# Patient Record
Sex: MALE | Race: WHITE | HISPANIC OR LATINO
[De-identification: names, ages, dates, MRNs, and addresses within clinical notes are randomized per-mention and may not be internally consistent; named-entity substitution may affect disease eponyms.]

---

## 2021-07-02 ENCOUNTER — TRANSCRIPTION ENCOUNTER (OUTPATIENT)
Age: 26
End: 2021-07-02

## 2022-05-01 ENCOUNTER — EMERGENCY (EMERGENCY)
Facility: HOSPITAL | Age: 27
LOS: 0 days | Discharge: HOME | End: 2022-05-01
Attending: STUDENT IN AN ORGANIZED HEALTH CARE EDUCATION/TRAINING PROGRAM | Admitting: STUDENT IN AN ORGANIZED HEALTH CARE EDUCATION/TRAINING PROGRAM
Payer: COMMERCIAL

## 2022-05-01 VITALS
HEIGHT: 73 IN | OXYGEN SATURATION: 98 % | SYSTOLIC BLOOD PRESSURE: 137 MMHG | RESPIRATION RATE: 18 BRPM | WEIGHT: 240.08 LBS | TEMPERATURE: 98 F | DIASTOLIC BLOOD PRESSURE: 82 MMHG | HEART RATE: 74 BPM
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
## 2022-05-01 DIAGNOSIS — M25.512 PAIN IN LEFT SHOULDER: ICD-10-CM
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
## 2022-05-01 PROCEDURE — 99283 EMERGENCY DEPT VISIT LOW MDM: CPT

## 2022-05-01 RX ORDER — IBUPROFEN 200 MG
1 TABLET ORAL
Qty: 20 | Refills: 0
Start: 2022-05-01 | End: 2022-05-05

## 2022-05-01 RX ORDER — METHOCARBAMOL 500 MG/1
2 TABLET, FILM COATED ORAL
Qty: 40 | Refills: 0
Start: 2022-05-01 | End: 2022-05-05

## 2022-05-01 NOTE — ED PROVIDER NOTE - NS ED ROS FT
Review of Systems    Constitutional: (-) fever   Eyes/ENT: (-) vision changes  Cardiovascular: (-) chest pain, (-) syncope (-) palpitations  Respiratory: (-) cough, (-) shortness of breath  Gastrointestinal: (-) vomiting, (-) diarrhea  (-) abdominal pain  Genitourinary:  (-) dysuria   Musculoskeletal: (-) neck pain, (-) back pain, (-) leg pain/swelling see hpi   Integumentary: (-) rash, (-) edema  Neurological: (-) headache  Hematologic: (-) easy bruising

## 2022-05-01 NOTE — ED PROVIDER NOTE - NS ED ATTENDING STATEMENT MOD
This was a shared visit with the MDAELINE. I reviewed and verified the documentation and independently performed the documented:
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
## 2022-05-01 NOTE — ED PROVIDER NOTE - PHYSICAL EXAMINATION
PHYSICAL EXAM:    GENERAL: Alert, appears stated age, well appearing, non-toxic  SKIN: Warm, pink and dry. MMM.   HEAD: NC, AT, no step offs   EYE: Normal lids/conjunctiva  ENT: Normal hearing, patent oropharynx  NECK: +supple. No meningismus, or JVD, +Trachea midline. no tenderness/step offs.   Pulm: Bilateral BS, normal resp effort, no wheezes, stridor, or retractions  CV: RRR, no M/R/G, 2+and = radial pulses  Abd: soft, non-tender, non-distended, no rebound/guarding   Mskel: no erythema, cyanosis, edema. no calf tenderness. no spinal TTP. no shouldeer ttp. no decreased rom. no external evidence of trauma  Neuro: AAOx3, no sensory/motor deficits,  No speech slurring, pronator drift, facial asymmetry.. 5/5 strength throughout. normal gait.

## 2022-05-01 NOTE — ED PROVIDER NOTE - NSFOLLOWUPINSTRUCTIONS_ED_ALL_ED_FT

## 2022-05-01 NOTE — ED PROVIDER NOTE - PATIENT PORTAL LINK FT
You can access the FollowMyHealth Patient Portal offered by [facility] by registering at the following website: [email or web address]. By joining [employer]’s FollowMyHealth portal, you will also be able to view your health information using other applications (apps) compatible with our system.

## 2022-10-18 ENCOUNTER — APPOINTMENT (OUTPATIENT)
Dept: NEUROLOGY | Facility: CLINIC | Age: 27
End: 2022-10-18

## 2022-10-18 PROBLEM — Z00.00 ENCOUNTER FOR PREVENTIVE HEALTH EXAMINATION: Status: ACTIVE | Noted: 2022-10-18

## 2022-12-07 ENCOUNTER — APPOINTMENT (OUTPATIENT)
Dept: NEUROLOGY | Facility: CLINIC | Age: 27
End: 2022-12-07

## 2022-12-07 VITALS
HEIGHT: 72 IN | WEIGHT: 235 LBS | SYSTOLIC BLOOD PRESSURE: 138 MMHG | HEART RATE: 78 BPM | DIASTOLIC BLOOD PRESSURE: 95 MMHG | BODY MASS INDEX: 31.83 KG/M2

## 2022-12-07 PROCEDURE — 99214 OFFICE O/P EST MOD 30 MIN: CPT

## 2022-12-07 PROCEDURE — 99072 ADDL SUPL MATRL&STAF TM PHE: CPT
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
## 2022-12-07 NOTE — ASSESSMENT
[FreeTextEntry1] :  cervical/thoracic pain -since he has done physical therapy for a month without resolution of his pain, recommend getting a MRI of the cervical / thoracic spine for further evaluation and since he has done physical therapy without  consistent improvement, I recommend seen pain management for further treatment

## 2022-12-07 NOTE — PHYSICAL EXAM
[Person] : oriented to person [Place] : oriented to place [Time] : oriented to time [Concentration Intact] : normal concentrating ability [Visual Intact] : visual attention was ~T not ~L decreased [Naming Objects] : no difficulty naming common objects [Repeating Phrases] : no difficulty repeating a phrase [Writing A Sentence] : no difficulty writing a sentence [Fluency] : fluency intact [Comprehension] : comprehension intact [Reading] : reading intact [Past History] : adequate knowledge of personal past history [Cranial Nerves Optic (II)] : visual acuity intact bilaterally,  visual fields full to confrontation, pupils equal round and reactive to light [Cranial Nerves Oculomotor (III)] : extraocular motion intact [Cranial Nerves Trigeminal (V)] : facial sensation intact symmetrically [Cranial Nerves Facial (VII)] : face symmetrical [Cranial Nerves Vestibulocochlear (VIII)] : hearing was intact bilaterally [Cranial Nerves Glossopharyngeal (IX)] : tongue and palate midline [Cranial Nerves Accessory (XI - Cranial And Spinal)] : head turning and shoulder shrug symmetric [Cranial Nerves Hypoglossal (XII)] : there was no tongue deviation with protrusion [Motor Tone] : muscle tone was normal in all four extremities [Motor Strength] : muscle strength was normal in all four extremities [No Muscle Atrophy] : normal bulk in all four extremities [Sensation Tactile Decrease] : light touch was intact [Abnormal Walk] : normal gait [Balance] : balance was intact [Past-pointing] : there was no past-pointing [Tremor] : no tremor present [2+] : Ankle jerk left 2+ [Plantar Reflex Right Only] : normal on the right [Plantar Reflex Left Only] : normal on the left [FreeTextEntry6] :   Stiffness of bilateral trapezii as well as paraspinal muscles in the cervical and thoracic region

## 2022-12-24 ENCOUNTER — EMERGENCY (EMERGENCY)
Facility: HOSPITAL | Age: 27
LOS: 0 days | Discharge: HOME | End: 2022-12-24
Attending: EMERGENCY MEDICINE | Admitting: EMERGENCY MEDICINE
Payer: COMMERCIAL

## 2022-12-24 VITALS
HEIGHT: 72 IN | TEMPERATURE: 97 F | OXYGEN SATURATION: 99 % | HEART RATE: 84 BPM | RESPIRATION RATE: 18 BRPM | WEIGHT: 240.08 LBS | DIASTOLIC BLOOD PRESSURE: 83 MMHG | SYSTOLIC BLOOD PRESSURE: 181 MMHG

## 2022-12-24 DIAGNOSIS — L08.9 LOCAL INFECTION OF THE SKIN AND SUBCUTANEOUS TISSUE, UNSPECIFIED: ICD-10-CM

## 2022-12-24 DIAGNOSIS — N48.89 OTHER SPECIFIED DISORDERS OF PENIS: ICD-10-CM

## 2022-12-24 PROCEDURE — 99284 EMERGENCY DEPT VISIT MOD MDM: CPT

## 2022-12-24 RX ORDER — KETOROLAC TROMETHAMINE 30 MG/ML
30 SYRINGE (ML) INJECTION ONCE
Refills: 0 | Status: DISCONTINUED | OUTPATIENT
Start: 2022-12-24 | End: 2022-12-24

## 2022-12-24 RX ORDER — CEPHALEXIN 500 MG
1 CAPSULE ORAL
Qty: 40 | Refills: 0
Start: 2022-12-24 | End: 2023-01-02

## 2022-12-24 RX ADMIN — Medication 30 MILLIGRAM(S): at 20:31

## 2022-12-24 NOTE — ED PROVIDER NOTE - OBJECTIVE STATEMENT
27-year-old male no pertinent past medical history presents for evaluation of penile lesion.  Patient states he has had small bump on his penis for years and yesterday tried to pop it unsuccessfully and since developed redness mild swelling around the area on the shaft of his penis.  Now presents with mild aching pain localized to the area, no aggravating or relieving factors.  Denies fever, headache, chest pain, shortness of breath, numbness, weakness, dysuria, hematuria, discharge, change in sexual partners

## 2022-12-24 NOTE — ED PROVIDER NOTE - PHYSICAL EXAMINATION
CONST: Well appearing in NAD  EYES: Sclera and conjunctiva clear.   ENT: No nasal discharge. Oropharynx normal appearing, no erythema or exudates. No abscess or swelling. Uvula midline.   NECK: Non-tender, no meningeal signs. normal ROM. supple   CARD: S1 S2; No jvd  RESP: Equal BS B/L, No wheezes, rhonchi or rales. No distress  GI: Soft, non-tender, non-distended. no cva tenderness. normal BS  : mild erythema to L shaft of penis with mild induration. Nontender, no dc, nontender testicles. (-) phrens, (+) cremasteric   MS: Normal ROM in all extremities. pulses 2 +. no calf tenderness or swelling  SKIN: Warm, dry, no acute rashes. Good turgor  NEURO: A&Ox3, No focal deficits. Strength 5/5 with no sensory deficits.

## 2022-12-24 NOTE — ED PROVIDER NOTE - ATTENDING APP SHARED VISIT CONTRIBUTION OF CARE
27 y.o. male, no PMH, comes in for evaluation of penile lesion.  Patient states he has had small bump on his penis for years and yesterday tried to pop it unsuccessfully and since developed redness and mild swelling around the area on the shaft of his penis.  Now presents with mild aching pain localized to the area, no aggravating or relieving factors.  Denies fever, headache, chest pain, shortness of breath, numbness, weakness, dysuria, hematuria, discharge, change in sexual partners. On exam, pt in NAD, AAOx3, head NC/AT, CN II-XII intact, PEERL, EOMi, neck (-) midline tenderness, lungs CTA B/L, CV S1S2 regular, abdomen soft/NT/ND/(+)BS, ext (-) edema, motor 5/5x4, sensation intact,  (+) mild erythema to left side of the shaft of the penis with mild induration. Testicles NT, cremasteric reflex intact. Will d/c with abx. Advised to return for worsening of symptoms.

## 2022-12-24 NOTE — ED PROVIDER NOTE - PATIENT PORTAL LINK FT
You can access the FollowMyHealth Patient Portal offered by [facility] by registering at the following website: [email or web address]. By joining [employer]’s FollowMyHealth portal, you will also be able to view your health information using other applications (apps) compatible with our system.

## 2023-01-19 ENCOUNTER — APPOINTMENT (OUTPATIENT)
Dept: PAIN MANAGEMENT | Facility: CLINIC | Age: 28
End: 2023-01-19
Payer: COMMERCIAL

## 2023-01-19 VITALS — BODY MASS INDEX: 31.83 KG/M2 | WEIGHT: 235 LBS | HEIGHT: 72 IN

## 2023-01-19 PROCEDURE — 99072 ADDL SUPL MATRL&STAF TM PHE: CPT

## 2023-01-19 PROCEDURE — 99204 OFFICE O/P NEW MOD 45 MIN: CPT

## 2023-01-19 RX ORDER — MELOXICAM 15 MG/1
15 TABLET ORAL DAILY
Qty: 30 | Refills: 0 | Status: ACTIVE | COMMUNITY
Start: 2023-01-19 | End: 1900-01-01

## 2023-01-23 NOTE — PHYSICAL EXAM
[de-identified] : Cervical Spine Exam:\par \par Inspection:\par erythema (-) \par ecchymosis (-) \par rashes (-) \par \par Palpation:                                        \par Cervical paraspinal mm tenderness:   R (+); L(+)\par Upper trapezius mm tenderness:        R (+); L (+)\par Thoracic paraspinal muscles tenderness: R+, L+\par Rhomboids tenderness:                       R (-); L (-)\par Scalenes mm tenderness:                   R (-); L (-)\par Occipital Ridge:                                    R (-); L (-)\par Supraspinatus mm tenderness:           R (-); L (-)\par \par Strength Testing:            Right    Left\par Deltoid                             (5/5)    (5/5)\par Biceps:                            (5/5)    (5/5)\par Triceps:                           (5/5)    (5/5)\par Finger Abductors:           (5/5)    (5/5)\par Grasp:                             (5/5)    (5/5)\par \par Special Testing:\par Spurling Test:                  R (-); L (-)\par Facet load test:               R (-); L (-)
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
## 2023-01-23 NOTE — REASON FOR VISIT
[Initial Visit] : an initial pain management visit [FreeTextEntry2] : evaluation neck and back pain

## 2023-01-23 NOTE — DISCUSSION/SUMMARY
[de-identified] : Recomendations\par 1. meloxicam 15mg daily PRN\par I advised CHIKI that the NSAID should be taken with food.  In addition while taking the prescribed NSAID, no over the counter or other NSAIDs should be used, such as ibuprofen (Motrin or Advil) or naproxen (Aleve) as this can cause stomach upset or other side effects.  If needed for fever or breakthrough pain Tylenol can be used.\par \par 2. MRI imaging of cervical / thoracic spine\par f/u when imaging complete\par \par f/u for imaging review 1 month

## 2023-03-16 ENCOUNTER — APPOINTMENT (OUTPATIENT)
Dept: PAIN MANAGEMENT | Facility: CLINIC | Age: 28
End: 2023-03-16
Payer: COMMERCIAL

## 2023-03-16 PROCEDURE — 99072 ADDL SUPL MATRL&STAF TM PHE: CPT

## 2023-03-16 PROCEDURE — 99213 OFFICE O/P EST LOW 20 MIN: CPT

## 2023-03-20 NOTE — PHYSICAL EXAM
[de-identified] : Cervical Spine Exam:\par \par Inspection:\par erythema (-) \par ecchymosis (-) \par rashes (-) \par \par Palpation:                                        \par Cervical paraspinal mm tenderness:   R (+); L(+)\par Upper trapezius mm tenderness:        R (+); L (+)\par Thoracic paraspinal muscles tenderness: R+, L+ \par Rhomboids tenderness:                       R (-); L (-)\par Scalenes mm tenderness:                   R (-); L (-)\par Occipital Ridge:                                    R (-); L (-)\par Supraspinatus mm tenderness:           R (-); L (-)\par \par Strength Testing:            Right    Left\par Deltoid                             (5/5)    (5/5)\par Biceps:                            (5/5)    (5/5)\par Triceps:                           (5/5)    (5/5)\par Finger Abductors:           (5/5)    (5/5)\par Grasp:                             (5/5)    (5/5)\par \par Special Testing:\par Spurling Test:                  R (-); L (-)\par Facet load test:               R (-); L (-)

## 2023-03-20 NOTE — DATA REVIEWED
[FreeTextEntry1] : MRI of the cervical spine taken on 3/15/23 shows broad based central posterior disc herniation with annular tear at C3-C4 indenting the thecal sac. Disc bulges at C4-C5 and C6-C7 indenting the thecal sac.\par \par MRI of the Thoracic spine taken on 3/15/23 showed broad based central posterior disc herniation with annular tear and superior extrusion at T7-T8 indenting the thecal sac.

## 2023-03-20 NOTE — DISCUSSION/SUMMARY
[de-identified] : Recommendations.\par \par 1. Consult with Dr. Burnett to discuss surgical option before trying epidural. The patient is reluctant to try the epidural before hearing surgical options. \par \par 2. Home Exercise\par I gave the patient a list of home exercises to do for pain reduction and overall improvement in functional status including but not limited to active neck rotation, active neck side bend, neck flexion, neck extension, chin tuck, scalene stretch, isometric neck flexion, isometric neck extension, isometric neck sidebend, headlift/neck curl, headlift/neck sidebend, neck extension on hands and knees, and scapula squeeze.\par \par I Ashli Hayes attest that this documentation has been prepared under the direction and in the presence of provider Dr. Mark Sanchez. \par The documentation recorded by the scribe in my presence, accurately reflects the service I personally performed, and the decisions made by me with my edits as appropriate. \par \par Mark Sanchez, DO\par
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
## 2023-03-29 ENCOUNTER — APPOINTMENT (OUTPATIENT)
Dept: NEUROLOGY | Facility: CLINIC | Age: 28
End: 2023-03-29

## 2023-04-25 ENCOUNTER — APPOINTMENT (OUTPATIENT)
Dept: NEUROSURGERY | Facility: CLINIC | Age: 28
End: 2023-04-25
Payer: COMMERCIAL

## 2023-04-25 ENCOUNTER — RESULT REVIEW (OUTPATIENT)
Age: 28
End: 2023-04-25

## 2023-04-25 VITALS — BODY MASS INDEX: 31.15 KG/M2 | HEIGHT: 72 IN | WEIGHT: 230 LBS

## 2023-04-25 PROCEDURE — 99204 OFFICE O/P NEW MOD 45 MIN: CPT

## 2023-04-27 NOTE — END OF VISIT
[FreeTextEntry3] : ATTENDING ATTESTATION\par Patient seen and examined at bedside, all relevant diagnostic imaging and electronic medical record were independently evaluated by me.  Assessment and plan per ACP note above. [Time Spent: ___ minutes] : I have spent [unfilled] minutes of time on the encounter.
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
## 2023-05-23 ENCOUNTER — APPOINTMENT (OUTPATIENT)
Dept: NEUROSURGERY | Facility: CLINIC | Age: 28
End: 2023-05-23

## 2024-01-18 ENCOUNTER — OUTPATIENT (OUTPATIENT)
Dept: OUTPATIENT SERVICES | Facility: HOSPITAL | Age: 29
LOS: 1 days | End: 2024-01-18
Payer: COMMERCIAL

## 2024-01-18 DIAGNOSIS — M54.6 PAIN IN THORACIC SPINE: ICD-10-CM

## 2024-01-18 DIAGNOSIS — M54.2 CERVICALGIA: ICD-10-CM

## 2024-01-18 PROCEDURE — 72082 X-RAY EXAM ENTIRE SPI 2/3 VW: CPT | Mod: 26

## 2024-01-18 PROCEDURE — 72052 X-RAY EXAM NECK SPINE 6/>VWS: CPT

## 2024-01-18 PROCEDURE — 72050 X-RAY EXAM NECK SPINE 4/5VWS: CPT | Mod: 26,59

## 2024-01-18 PROCEDURE — 72082 X-RAY EXAM ENTIRE SPI 2/3 VW: CPT

## 2024-01-19 DIAGNOSIS — M54.2 CERVICALGIA: ICD-10-CM

## 2024-01-19 DIAGNOSIS — M54.6 PAIN IN THORACIC SPINE: ICD-10-CM

## 2024-01-30 ENCOUNTER — APPOINTMENT (OUTPATIENT)
Dept: PAIN MANAGEMENT | Facility: CLINIC | Age: 29
End: 2024-01-30

## 2024-02-16 ENCOUNTER — APPOINTMENT (OUTPATIENT)
Dept: PAIN MANAGEMENT | Facility: CLINIC | Age: 29
End: 2024-02-16
Payer: COMMERCIAL

## 2024-02-16 PROCEDURE — 99214 OFFICE O/P EST MOD 30 MIN: CPT

## 2024-02-16 RX ORDER — METHYLPREDNISOLONE 4 MG/1
4 TABLET ORAL
Qty: 1 | Refills: 0 | Status: ACTIVE | COMMUNITY
Start: 2024-02-16 | End: 1900-01-01

## 2024-02-18 NOTE — PHYSICAL EXAM
[de-identified] : Cervical Spine Exam:\par  \par  Inspection:\par  erythema (-) \par  ecchymosis (-) \par  rashes (-) \par  \par  Palpation:                                        \par  Cervical paraspinal mm tenderness:   R (+); L(+)\par  Upper trapezius mm tenderness:        R (+); L (+)\par  Thoracic paraspinal muscles tenderness: R+, L+ \par  Rhomboids tenderness:                       R (-); L (-)\par  Scalenes mm tenderness:                   R (-); L (-)\par  Occipital Ridge:                                    R (-); L (-)\par  Supraspinatus mm tenderness:           R (-); L (-)\par  \par  Strength Testing:            Right    Left\par  Deltoid                             (5/5)    (5/5)\par  Biceps:                            (5/5)    (5/5)\par  Triceps:                           (5/5)    (5/5)\par  Finger Abductors:           (5/5)    (5/5)\par  Grasp:                             (5/5)    (5/5)\par  \par  Special Testing:\par  Spurling Test:                  R (-); L (-)\par  Facet load test:               R (-); L (-)

## 2024-02-18 NOTE — DISCUSSION/SUMMARY
[de-identified] : A discussion regarding available pain management treatment options occurred with the patient. These included interventional, rehabilitative, pharmacological, and alternative modalities. We will proceed with the following:   Interventional treatment options: 1. Proceed with a Cervical epidural steroid injection  Treatment options were discussed. The patient has been having persistent, severe neck pain and cervical radicular pain that has minimally improved with conservative therapy thus far (including physical therapy/chiropractic manipulations/home stretching and anti-inflammatory medications for 8 weeks. The patient was given the option of proceeding with a cervical epidural steroid injection to try to get the patient some pain relief.   The patient has trialed conservative care including home exercise program and nsaid use.  Pain has been persistent for over 1 year and is interested in pursuing consistent pain relief with interventional options, if indicated.   Medications: 1. Prescribed a Medrol dos 21-tablet, 6-day taper pack to bridge his pain until he is scheduled for this injection.  - Follow up in 2 weeks after injection.   I Shagufta Hutchinson attest that this documentation has been prepared under the direction and in the presence of provider Dr. Mark Sanchez.  The documentation recorded by the scribe in my presence, accurately reflects the service I personally performed, and the decisions made by me with my edits as appropriate.   Mark Sanchez, DO

## 2024-02-18 NOTE — DATA REVIEWED
[FreeTextEntry1] : MRI of the cervical spine taken on 3/15/23 shows broad based central posterior disc herniation with annular tear at C3-C4 indenting the thecal sac. Disc bulges at C4-C5 and C6-C7 indenting the thecal sac.  MRI of the Thoracic spine taken on 3/15/23 showed broad based central posterior disc herniation with annular tear and superior extrusion at T7-T8 indenting the thecal sac.   X-ray of the cervical spine taken on 1- Impression: C1-C7 are visualized on the lateral views. The vertebral body heights are maintained. There is trace anterolisthesis of C2 on C3 without dynamic instability. The intervertebral disc spaces are grossly preserved. The prevertebral soft tissues are not thickened.
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
## 2024-04-02 ENCOUNTER — APPOINTMENT (OUTPATIENT)
Dept: NEUROSURGERY | Facility: CLINIC | Age: 29
End: 2024-04-02
Payer: COMMERCIAL

## 2024-04-02 VITALS — HEIGHT: 72 IN | WEIGHT: 230 LBS | BODY MASS INDEX: 31.15 KG/M2

## 2024-04-02 DIAGNOSIS — M54.2 CERVICALGIA: ICD-10-CM

## 2024-04-02 PROCEDURE — 99213 OFFICE O/P EST LOW 20 MIN: CPT
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
## 2024-04-09 PROBLEM — M54.2 CERVICALGIA: Status: ACTIVE | Noted: 2022-10-18

## 2024-05-15 ENCOUNTER — APPOINTMENT (OUTPATIENT)
Dept: PAIN MANAGEMENT | Facility: CLINIC | Age: 29
End: 2024-05-15
Payer: COMMERCIAL

## 2024-05-15 PROCEDURE — 99213 OFFICE O/P EST LOW 20 MIN: CPT

## 2024-05-15 NOTE — DISCUSSION/SUMMARY
[de-identified] : A discussion regarding available pain management treatment options occurred with the patient. These included interventional, rehabilitative, pharmacological, and alternative modalities. We will proceed with the following:   Interventional treatment options: 1. Proceed with a C7-T1 cervical epidural steroid injection with sedation.  Treatment options were discussed. The patient has been having persistent, severe neck pain and cervical radicular pain that has minimally improved with conservative therapy thus far (including physical therapy/chiropractic manipulations/home stretching and anti-inflammatory medications for 8 weeks. The patient was given the option of proceeding with a cervical epidural steroid injection to try to get the patient some pain relief.   The patient has severe anxiety of procedures that necessitates monitored anesthesia care (MAC). The procedure performed will be close to major nerves, arteries, and spinal cord and/or joint structures. Due to the proximity of these structures, we need the patient to be still during the procedure.  With the help of MAC, this will be safely achieved and decrease the risk of any complications.   - Follow up in 2 weeks post injection.   I Shagufta Hutchinson attest that this documentation has been prepared under the direction and in the presence of provider Dr. Mark Sanchez.  The documentation recorded by the scribe in my presence, accurately reflects the service I personally performed, and the decisions made by me with my edits as appropriate.   Mark Sanchez, DO

## 2024-05-15 NOTE — PHYSICAL EXAM
[de-identified] : C spine  No rashes, erythema, edema noted TTP b/l cervical paraspinal and trapezius muscles Positive spurlings bilaterally RUE: 5/5 strength LUE: 5/5 strength
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
## 2024-05-30 ENCOUNTER — APPOINTMENT (OUTPATIENT)
Dept: PAIN MANAGEMENT | Facility: CLINIC | Age: 29
End: 2024-05-30
Payer: COMMERCIAL

## 2024-05-30 ENCOUNTER — APPOINTMENT (OUTPATIENT)
Dept: PAIN MANAGEMENT | Facility: CLINIC | Age: 29
End: 2024-05-30

## 2024-05-30 PROCEDURE — 00600 ANES PX CRV SPINE&CORD NOS: CPT | Mod: QZ,P1

## 2024-05-30 PROCEDURE — 62321 NJX INTERLAMINAR CRV/THRC: CPT

## 2024-06-04 NOTE — PROCEDURE
[FreeTextEntry3] : Date of Service: 05/30/2024   Account: [id number]  Patient: CHIKI CHOI   YOB: 1995  Age: 28 year  Surgeon:      Mark Sanchez D.O.  Assistant:    None  Pre-Operative Diagnosis:         Cervical Radiculopathy (M54.12)  Post Operative Diagnosis:       Cervical Radiculopathy (M54.12)  Procedure:             Cervical (C7-T1) interlaminar epidural steroid injection under fluoroscopic guidance  Anesthesia:  MAC  This procedure was carried out using fluoroscopic guidance.  The risks and benefits of the procedure were discussed extensively with the patient.  The consent of the patient was obtained and the following procedure was performed. The patient was placed in the prone position on the fluoroscopy table and the area was prepped and draped in a sterile fashion.  A timeout was performed with all essential staff present and the site and side were verified.  The patient was placed in the prone position and optimized to patient comfort.  The cervical area was prepped and draped in a sterile fashion.  The fluoroscope visualized the C7-T1 interspace using slight cephalad-caudad angulation and this area was marked.  Using sterile technique the superficial skin was anesthetized with 1% Lidocaine.  A 20 gauge 3.5 inch Tuohy needle was advanced under fluoroscopy until ligament was engaged.  Using a contralateral oblique view, a "loss of resistance" to air technique was utilized in order to gain access to the epidural space.  After negative aspiration for heme and CSF, 1 cc of Omnipaque contrast was administered and the appropriate cervical epidurogram was obtained in the JOSE and A/P view as well as digital subtraction angiography.  A total injectate of 3 cc of preservative free normal saline and 10mg decadron was then injected into the epidural space while maintaining meaningful verbal contact with the patient.    The needle was subsequently removed.  Vital signs remained normal.  Pulse oximeter was used throughout the procedure and the patient's pulse and oxygen saturation remained within normal limits.  The patient tolerated the procedure well.  There were no complications.  The patient was instructed to apply ice over the injection sites for twenty minutes every two hours for the next 24 to 48 hours.  Disposition:       1. The patient was advised to F/U in 1-2 weeks to assess the response to the injection.      2. The patient was also instructed to contact me immediately if there were any concerns related to the procedure performed.

## 2024-06-14 ENCOUNTER — APPOINTMENT (OUTPATIENT)
Dept: PAIN MANAGEMENT | Facility: CLINIC | Age: 29
End: 2024-06-14
Payer: COMMERCIAL

## 2024-06-14 DIAGNOSIS — M54.6 PAIN IN THORACIC SPINE: ICD-10-CM

## 2024-06-14 DIAGNOSIS — M54.12 RADICULOPATHY, CERVICAL REGION: ICD-10-CM

## 2024-06-14 PROCEDURE — 99213 OFFICE O/P EST LOW 20 MIN: CPT

## 2024-06-17 PROBLEM — M54.6 THORACIC SPINE PAIN: Status: ACTIVE | Noted: 2022-12-07

## 2024-06-17 PROBLEM — M54.12 CERVICAL RADICULITIS: Status: ACTIVE | Noted: 2024-02-16

## 2024-06-17 NOTE — DISCUSSION/SUMMARY
[de-identified] : A discussion regarding available pain management treatment options occurred with the patient. These included interventional, rehabilitative, pharmacological, and alternative modalities. We will proceed with the following:   Interventional treatment options: - None indicated at this time.   - Follow up in 3 months.   I Shagufta Hutchinson attest that this documentation has been prepared under the direction and in the presence of provider Dr. Mark Sanchez.  The documentation recorded by the scribe in my presence, accurately reflects the service I personally performed, and the decisions made by me with my edits as appropriate.   Total clinician time spent today on the patient is 20 minutes including preparing to see the patient, obtaining and/or reviewing and confirming history, performing medically necessary and appropriate examination, counseling and educating the patient and/or family, documenting clinical information in the EHR and communicating and/or referring to other healthcare professionals.   Mark Sanchez, DO

## 2024-06-17 NOTE — PHYSICAL EXAM
[de-identified] : C spine  No rashes, erythema, edema noted TTP b/l cervical paraspinal and trapezius muscles Positive spurlings bilaterally RUE: 5/5 strength LUE: 5/5 strength
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
## 2024-09-20 ENCOUNTER — APPOINTMENT (OUTPATIENT)
Dept: PAIN MANAGEMENT | Facility: CLINIC | Age: 29
End: 2024-09-20

## 2024-09-20 DIAGNOSIS — M54.12 RADICULOPATHY, CERVICAL REGION: ICD-10-CM

## 2024-09-20 PROCEDURE — 99214 OFFICE O/P EST MOD 30 MIN: CPT

## 2024-09-20 RX ORDER — DICLOFENAC SODIUM 75 MG/1
75 TABLET, DELAYED RELEASE ORAL
Qty: 28 | Refills: 0 | Status: ACTIVE | COMMUNITY
Start: 2024-09-20 | End: 1900-01-01

## 2024-09-23 NOTE — PHYSICAL EXAM
[de-identified] : C spine  No rashes, erythema, edema noted TTP b/l cervical paraspinal and trapezius muscles Positive spurlings bilaterally RUE: 5/5 strength LUE: 5/5 strength
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
## 2024-09-23 NOTE — PHYSICAL EXAM
[de-identified] : C spine  No rashes, erythema, edema noted TTP b/l cervical paraspinal and trapezius muscles Positive spurlings bilaterally RUE: 5/5 strength LUE: 5/5 strength

## 2024-09-23 NOTE — DISCUSSION/SUMMARY
[de-identified] : A discussion regarding available pain management treatment options occurred with the patient. These included interventional, rehabilitative, pharmacological, and alternative modalities. We will proceed with the following:   Interventional treatment options: 1. Proceed with a C7-T1 cervical epidural steroid injection with sedation.  Treatment options were discussed. The patient has been having persistent, severe neck pain and cervical radicular pain that has minimally improved with conservative therapy thus far (including physical therapy/chiropractic manipulations/home stretching and anti-inflammatory medications for 8 weeks. The patient was given the option of proceeding with a cervical epidural steroid injection to try to get the patient some pain relief.    The patient has severe anxiety of procedures that necessitates monitored anesthesia care (MAC). The procedure performed will be close to major nerves, arteries, and spinal cord and/or joint structures. Due to the proximity of these structures, we need the patient to be still during the procedure.  With the help of MAC, this will be safely achieved and decrease the risk of any complications.   Rehabilitative options: 1.  Continue with physical therapy for the cervical spine. 2/3x per week for 6 weeks.   Medications: 1. Ordered Diclofenac Sodium 75 mg a12h prn for pain.  * He was advised to stop this 5 days prior to epidural *  I advised Mr. Johnston that the NSAID should be taken with food.  In addition while taking the prescribed NSAID, no over the counter or other NSAIDs should be used, such as ibuprofen (Motrin or Advil) or naproxen (Aleve) as this can cause stomach upset or other side effects.  If needed for fever or breakthrough pain Tylenol can be used.   - Follow up 2 weeks post injection.   I Shagufta Hutchinson attest that this documentation has been prepared under the direction and in the presence of provider Dr. Mark Sanchez.  The documentation recorded by the scribe in my presence, accurately reflects the service I personally performed, and the decisions made by me with my edits as appropriate.   Mark Sanchez, DO

## 2024-09-23 NOTE — DISCUSSION/SUMMARY
[de-identified] : A discussion regarding available pain management treatment options occurred with the patient. These included interventional, rehabilitative, pharmacological, and alternative modalities. We will proceed with the following:   Interventional treatment options: 1. Proceed with a C7-T1 cervical epidural steroid injection with sedation.  Treatment options were discussed. The patient has been having persistent, severe neck pain and cervical radicular pain that has minimally improved with conservative therapy thus far (including physical therapy/chiropractic manipulations/home stretching and anti-inflammatory medications for 8 weeks. The patient was given the option of proceeding with a cervical epidural steroid injection to try to get the patient some pain relief.    The patient has severe anxiety of procedures that necessitates monitored anesthesia care (MAC). The procedure performed will be close to major nerves, arteries, and spinal cord and/or joint structures. Due to the proximity of these structures, we need the patient to be still during the procedure.  With the help of MAC, this will be safely achieved and decrease the risk of any complications.   Rehabilitative options: 1.  Continue with physical therapy for the cervical spine. 2/3x per week for 6 weeks.   Medications: 1. Ordered Diclofenac Sodium 75 mg a12h prn for pain.  * He was advised to stop this 5 days prior to epidural *  I advised Mr. Johnston that the NSAID should be taken with food.  In addition while taking the prescribed NSAID, no over the counter or other NSAIDs should be used, such as ibuprofen (Motrin or Advil) or naproxen (Aleve) as this can cause stomach upset or other side effects.  If needed for fever or breakthrough pain Tylenol can be used.   - Follow up 2 weeks post injection.   I Shagufta Hutchinson attest that this documentation has been prepared under the direction and in the presence of provider Dr. Mark Sanchez.  The documentation recorded by the scribe in my presence, accurately reflects the service I personally performed, and the decisions made by me with my edits as appropriate.   Mark Sanchez, DO

## 2024-10-01 ENCOUNTER — RX RENEWAL (OUTPATIENT)
Age: 29
End: 2024-10-01

## 2024-10-14 ENCOUNTER — APPOINTMENT (OUTPATIENT)
Dept: PAIN MANAGEMENT | Facility: CLINIC | Age: 29
End: 2024-10-14

## 2024-10-29 ENCOUNTER — APPOINTMENT (OUTPATIENT)
Dept: PAIN MANAGEMENT | Facility: CLINIC | Age: 29
End: 2024-10-29

## 2024-12-31 NOTE — ED ADULT TRIAGE NOTE - HEIGHT IN CM
185.42 Message sent to [facility] noting needle phobia that was expressed by pt at the last visit.

## 2025-03-04 ENCOUNTER — APPOINTMENT (OUTPATIENT)
Dept: ORTHOPEDIC SURGERY | Facility: CLINIC | Age: 30
End: 2025-03-04